# Patient Record
Sex: MALE | Race: OTHER | Employment: FULL TIME | URBAN - NONMETROPOLITAN AREA
[De-identification: names, ages, dates, MRNs, and addresses within clinical notes are randomized per-mention and may not be internally consistent; named-entity substitution may affect disease eponyms.]

---

## 2018-07-03 ENCOUNTER — OFFICE VISIT (OUTPATIENT)
Dept: UROLOGY | Age: 53
End: 2018-07-03
Payer: COMMERCIAL

## 2018-07-03 VITALS
WEIGHT: 285 LBS | DIASTOLIC BLOOD PRESSURE: 64 MMHG | HEIGHT: 67 IN | BODY MASS INDEX: 44.73 KG/M2 | SYSTOLIC BLOOD PRESSURE: 134 MMHG

## 2018-07-03 DIAGNOSIS — N48.1 BALANITIS: Primary | ICD-10-CM

## 2018-07-03 LAB
BILIRUBIN, POC: NORMAL
BLOOD URINE, POC: NORMAL
CLARITY, POC: NORMAL
COLOR, POC: NORMAL
GLUCOSE URINE, POC: NORMAL
KETONES, POC: NORMAL
LEUKOCYTE EST, POC: NORMAL
NITRITE, POC: NORMAL
PH, POC: NORMAL
PROTEIN, POC: NORMAL
SPECIFIC GRAVITY, POC: NORMAL
UROBILINOGEN, POC: NORMAL

## 2018-07-03 PROCEDURE — 99213 OFFICE O/P EST LOW 20 MIN: CPT | Performed by: NURSE PRACTITIONER

## 2018-07-03 PROCEDURE — 81002 URINALYSIS NONAUTO W/O SCOPE: CPT | Performed by: NURSE PRACTITIONER

## 2018-07-03 RX ORDER — CLOTRIMAZOLE AND BETAMETHASONE DIPROPIONATE 10; .64 MG/G; MG/G
CREAM TOPICAL
Qty: 1 TUBE | Refills: 5 | Status: SHIPPED | OUTPATIENT
Start: 2018-07-03 | End: 2018-08-21 | Stop reason: SDUPTHER

## 2018-07-03 RX ORDER — CELECOXIB 100 MG/1
100 CAPSULE ORAL 2 TIMES DAILY
COMMUNITY

## 2018-07-03 RX ORDER — M-VIT,TX,IRON,MINS/CALC/FOLIC 27MG-0.4MG
1 TABLET ORAL DAILY
COMMUNITY

## 2018-08-21 ENCOUNTER — OFFICE VISIT (OUTPATIENT)
Dept: UROLOGY | Age: 53
End: 2018-08-21
Payer: COMMERCIAL

## 2018-08-21 VITALS
WEIGHT: 281 LBS | HEIGHT: 66 IN | BODY MASS INDEX: 45.16 KG/M2 | SYSTOLIC BLOOD PRESSURE: 126 MMHG | DIASTOLIC BLOOD PRESSURE: 62 MMHG

## 2018-08-21 DIAGNOSIS — N47.5 PREPUTIAL ADHESIONS: ICD-10-CM

## 2018-08-21 DIAGNOSIS — N48.1 BALANITIS: Primary | ICD-10-CM

## 2018-08-21 PROCEDURE — 81002 URINALYSIS NONAUTO W/O SCOPE: CPT | Performed by: NURSE PRACTITIONER

## 2018-08-21 PROCEDURE — 99213 OFFICE O/P EST LOW 20 MIN: CPT | Performed by: NURSE PRACTITIONER

## 2018-08-21 RX ORDER — CLOTRIMAZOLE AND BETAMETHASONE DIPROPIONATE 10; .64 MG/G; MG/G
CREAM TOPICAL
Qty: 1 TUBE | Refills: 6 | Status: SHIPPED | OUTPATIENT
Start: 2018-08-21 | End: 2018-09-17

## 2018-08-21 NOTE — PROGRESS NOTES
[address] [facility]  [address] [place] [address]  Dept: [phone]  Loc: [phone]  Visit Date: 8/21/2018      HPI:     Henry Read f/u today for balanitis. He is doing well on Lotrisone cream, symptoms have greatly improved. Denies any open sores. He is performing good genital hygiene. He underwent circumcision on 11/22/16 due to severe phimosis with Dr. Gilberto Arroyo. Findings: Obliterated glans penis from adhesions of foreskin, reconstruction undertaken, minimal distal skin on which to stitch proximal healthy penile skin, very complicated circumcision completed. Surgical pathology consistent with balanitis xerotica obliterans, clinically phimosis. He comes in today by himself. Hx is obtained from the patient and medical record. Current Outpatient Prescriptions   Medication Sig Dispense Refill    Ferrous Gluconate-C-Folic Acid (IRON-C PO) Take by mouth      clotrimazole-betamethasone (LOTRISONE) 1-0.05 % cream Apply topically 2 times daily. 1 Tube 6    celecoxib (CELEBREX) 100 MG capsule Take 100 mg by mouth 2 times daily      Multiple Vitamins-Minerals (THERAPEUTIC MULTIVITAMIN-MINERALS) tablet Take 1 tablet by mouth daily       No current facility-administered medications for this visit. Past Medical History  Ildefonso Goncalves  has a past medical history of Arthritis; Liver disease; Male circumcision; and Pneumonia. Past Surgical History  The patient  has a past surgical history that includes knee surgery; Circumcision (2008); and Circumcision (11/22/2016). Family History  This patient's family history includes Diabetes in his brother, father, and mother. Social History  Shiva  reports that he quit smoking about 12 years ago. He has a 23.00 pack-year smoking history. He has quit using smokeless tobacco. He reports that he does not drink alcohol or use drugs.     Subjective:     Review of Systems  No problems with

## 2019-08-14 ENCOUNTER — HOSPITAL ENCOUNTER (OUTPATIENT)
Dept: GENERAL RADIOLOGY | Age: 54
Discharge: HOME | End: 2019-08-14
Payer: COMMERCIAL

## 2019-08-14 ENCOUNTER — HOSPITAL ENCOUNTER (OUTPATIENT)
Age: 54
Discharge: HOME | End: 2019-08-14
Payer: COMMERCIAL

## 2019-08-14 DIAGNOSIS — R52 PAIN: ICD-10-CM

## 2019-08-14 PROCEDURE — 73564 X-RAY EXAM KNEE 4 OR MORE: CPT

## 2019-08-20 ENCOUNTER — OFFICE VISIT (OUTPATIENT)
Dept: UROLOGY | Age: 54
End: 2019-08-20
Payer: COMMERCIAL

## 2019-08-20 VITALS
BODY MASS INDEX: 43.95 KG/M2 | HEIGHT: 67 IN | SYSTOLIC BLOOD PRESSURE: 120 MMHG | DIASTOLIC BLOOD PRESSURE: 82 MMHG | WEIGHT: 280 LBS

## 2019-08-20 DIAGNOSIS — N47.5 PENILE ADHESIONS: ICD-10-CM

## 2019-08-20 DIAGNOSIS — N48.1 BALANITIS: Primary | ICD-10-CM

## 2019-08-20 PROCEDURE — 81002 URINALYSIS NONAUTO W/O SCOPE: CPT | Performed by: NURSE PRACTITIONER

## 2019-08-20 PROCEDURE — 99213 OFFICE O/P EST LOW 20 MIN: CPT | Performed by: NURSE PRACTITIONER

## 2019-08-20 RX ORDER — CLOTRIMAZOLE AND BETAMETHASONE DIPROPIONATE 10; .64 MG/G; MG/G
CREAM TOPICAL
Qty: 1 TUBE | Refills: 5 | Status: SHIPPED | OUTPATIENT
Start: 2019-08-20 | End: 2020-08-18 | Stop reason: SDUPTHER

## 2020-07-24 ENCOUNTER — HOSPITAL ENCOUNTER (OUTPATIENT)
Age: 55
Discharge: HOME | End: 2020-07-24
Payer: COMMERCIAL

## 2020-07-24 ENCOUNTER — HOSPITAL ENCOUNTER (OUTPATIENT)
Dept: GENERAL RADIOLOGY | Age: 55
Discharge: HOME | End: 2020-07-24
Payer: COMMERCIAL

## 2020-07-24 PROCEDURE — 73564 X-RAY EXAM KNEE 4 OR MORE: CPT

## 2020-08-18 ENCOUNTER — OFFICE VISIT (OUTPATIENT)
Dept: UROLOGY | Age: 55
End: 2020-08-18
Payer: COMMERCIAL

## 2020-08-18 VITALS — BODY MASS INDEX: 47.09 KG/M2 | HEIGHT: 67 IN | WEIGHT: 300 LBS | TEMPERATURE: 97 F

## 2020-08-18 PROCEDURE — 81002 URINALYSIS NONAUTO W/O SCOPE: CPT | Performed by: NURSE PRACTITIONER

## 2020-08-18 PROCEDURE — 99213 OFFICE O/P EST LOW 20 MIN: CPT | Performed by: NURSE PRACTITIONER

## 2020-08-18 RX ORDER — CLOTRIMAZOLE AND BETAMETHASONE DIPROPIONATE 10; .64 MG/G; MG/G
CREAM TOPICAL
Qty: 1 TUBE | Refills: 5 | Status: SHIPPED | OUTPATIENT
Start: 2020-08-18 | End: 2021-04-06 | Stop reason: SDUPTHER

## 2020-08-18 NOTE — PROGRESS NOTES
[address]  [address] [address]  Dept: [phone]  Loc: [phone]  Visit Date: 8/18/2020      HPI:     Jasmin Karrie f/u today for balanitis. Doing okay, reports pain with penile cracking, splitting secondary to intercourse and erections, this occurs every other night. Overall doing okay. Uses Lotrisone cream PRN  Weight has fluctuated        He underwent circumcision on 11/22/16 due to severe phimosis with Dr. Rica Chang. Findings: Obliterated glans penis from adhesions of foreskin, reconstruction undertaken, minimal distal skin on which to stitch proximal healthy penile skin, very complicated circumcision completed. Surgical pathology consistent with balanitis xerotica obliterans, clinically phimosis. He denies any other urinary symptoms or frequent UTIs     He comes in today by himself. Hx is obtained from the patient and medical record. Current Outpatient Medications   Medication Sig Dispense Refill    clotrimazole-betamethasone (LOTRISONE) 1-0.05 % cream Apply topically 2 times daily as needed for balanitis 1 Tube 5    celecoxib (CELEBREX) 100 MG capsule Take 100 mg by mouth 2 times daily      Multiple Vitamins-Minerals (THERAPEUTIC MULTIVITAMIN-MINERALS) tablet Take 1 tablet by mouth daily      Ferrous Gluconate-C-Folic Acid (IRON-C PO) Take by mouth       No current facility-administered medications for this visit. Past [address]  has a past medical history of Arthritis, Liver disease, Male circumcision, and Pneumonia. Past Surgical History  The patient  has a past surgical history that includes knee surgery; Circumcision (2008); Circumcision (11/22/2016); and Colonoscopy (2018). Family History  This patient's family history includes Diabetes in his brother, father, and mother. Social History  Mini Scott  reports that he quit smoking about 14 years ago. He has a 23.00 pack-year smoking history.  He has quit using smokeless tobacco. He reports current alcohol use. He reports that he does not use drugs. Subjective:     Review of Systems  No problems with ears, nose or throat. No problems with eyes. No chest pain, shortness of breath, abdominal pain, extremity pain or weakness, and no neurological deficits. No rashes.  symptoms per HPI. The remainder of the review of symptoms is negative. Objective:     PE:   Vitals:    08/18/20 1014   Temp: 97 °F (36.1 °C)   Weight: 300 lb (136.1 kg)   Height: 5' 6.5\" (1.689 m)       Constitutional: Alert and oriented times 3, no acute distress and cooperative to examination with appropriate mood and affect. HENT:   Head:        Normocephalic and atraumatic. Mouth/Throat:         Mucous membranes are normal.   Eyes:         EOM are normal. No scleral icterus. PERRLA. Neck:        Supple, symmetrical, trachea midline  Pulmonary/Chest:      Chest symmetric with normal A/P diameter, Normal respiratory rate and rhthym. No use of accessory muscles. Abdominal:         Soft. No tenderness. Bowel sounds present. Musculoskeletal:         Normal range of motion. No edema or tenderness of lower extremities. Extremities: No cyanosis, clubbing, or edema present. Neurological:        Alert and oriented. No cranial nerve deficit. [facility]:        Normal mood and affect.   Genitalia: circumcised penis with urethral meatus in normal location, cracked skin and adhesions noted, on ventral side of penis to the right and left     Labs   Urine dip demonstrates   Results for POC orders placed in visit on 08/18/20   POCT Urinalysis no Micro   Result Value Ref Range    Color, UA      Clarity, UA      Glucose, UA POC      Bilirubin, UA      Ketones, UA      Spec Grav, UA      Blood, UA POC neg     pH, UA      Protein, UA POC      Urobilinogen, UA      Leukocytes, UA neg     Nitrite, UA neg        Patients recent PSA values are as follows  No results found for: PSA, PSADIA Recent BUN/Creatinine:  Lab Results   Component Value Date    BUN 17 11/24/2016    CREATININE 1.1 11/24/2016       Radiology  No recent imaging       Assessment & Plan:     Balanitis  Preputial Adhesions      Has penile adhesions, he reports they are painful, especially with erections at night. He is s/p circumcisions x2 in the past. He uses lotrisone cream with some improvement. He is undergoing knee replacement in September and the other knee in 3 months so he is not interested in any surgical intervention in the immediate future. We discussed second opinion from surgeon to see if any other interventions would be beneficial for patient. No irritative urinary symptoms. FU in office with surgeon to evaluate penile adhesions.        GEOVANNI Puente  Urology

## 2020-09-04 ENCOUNTER — OFFICE VISIT (OUTPATIENT)
Dept: UROLOGY | Age: 55
End: 2020-09-04
Payer: COMMERCIAL

## 2020-09-04 VITALS — HEIGHT: 67 IN | WEIGHT: 297.2 LBS | TEMPERATURE: 98.2 F | BODY MASS INDEX: 46.65 KG/M2

## 2020-09-04 LAB
BILIRUBIN URINE: ABNORMAL
BLOOD URINE, POC: NEGATIVE
CHARACTER, URINE: CLEAR
COLOR, URINE: YELLOW
GLUCOSE URINE: NEGATIVE MG/DL
KETONES, URINE: ABNORMAL
LEUKOCYTE CLUMPS, URINE: NEGATIVE
NITRITE, URINE: NEGATIVE
PH, URINE: 5.5 (ref 5–9)
PROTEIN, URINE: NEGATIVE MG/DL
SPECIFIC GRAVITY, URINE: >= 1.03 (ref 1–1.03)
UROBILINOGEN, URINE: 0.2 EU/DL (ref 0–1)

## 2020-09-04 PROCEDURE — 81003 URINALYSIS AUTO W/O SCOPE: CPT | Performed by: UROLOGY

## 2020-09-04 PROCEDURE — 99213 OFFICE O/P EST LOW 20 MIN: CPT | Performed by: UROLOGY

## 2020-09-04 NOTE — PROGRESS NOTES
Gunnar Mccoy MD        [address] [place] [address].  [address] [place] [address]  Dept: [phone]  Dept Fax: [phone]: [address] [address] Urology Office Note -     Patient:  Estelle Pabon  YOB: 1965    The patient is a 53 y.o. male who presents today for evaluation of the following problems:   Chief Complaint   Patient presents with    Follow-up     balanitis, penile lesions        HISTORY OF PRESENT ILLNESS:     Penile adhesions  Onset was  Years ago  Overall, the problem(s) are worse. Severity is described as moderate to severe. Associated Symptoms: No dysuria, no gross hematuria. Current Pain Severity: 2    Pt of marietta's    Examination: severe penile skin bridges circumferentailly. completely fused  Around mid glans Very bothersome. Reports no infections. Constant discomfort . Discomfort with erections. Having knee surgery the middle of this month. Circumsion surgery twice. Discussed if we did do a circumcision surgery that there is a chance it may not work. Reports \"last surgeon informed him that is all he could do. \"'          Summary of Previous Records:  Estelle Pabon f/u today for balanitis. Doing okay, reports pain with penile cracking, splitting secondary to intercourse and erections, this occurs every other night. Overall doing okay. Uses Lotrisone cream PRN  Weight has fluctuated        He underwent circumcision on 11/22/16 due to severe phimosis with Dr. Hawk Torres. Findings: Obliterated glans penis from adhesions of foreskin, reconstruction undertaken, minimal distal skin on which to stitch proximal healthy penile skin, very complicated circumcision completed. Surgical pathology consistent with balanitis xerotica obliterans, clinically phimosis.   He denies any other urinary symptoms or frequent UTIs        Requested/reviewed records from Erika Reyes MD office and/or outside with stable of the joint space height. 4. There are stable marginal osteophytes off the posterior aspect of the upper and lower pole of the right patella. 5. There is a stable enthesophyte at the patellar attachment of the quadriceps tendon. 6. There is no fracture. 7. The bony mineralization is within normal limits. 8. There is no joint effusion. LEFT KNEE: 1. There is stable left kidney varus. 2. There are stable severe degenerative changes at the medial compartment of the left femoral-tibial articulation with complete loss of the joint space, subchondral sclerosis and marginal osteophyte off the outer margin of the medial tibial plateau. 3. There are stable marginal osteophytes at the lateral compartment of the left femoral-tibial articulation with preservation of the joint space height. 4. There are stable marginal osteophytes off the posterior aspect of the upper and lower pole the patella. 5. There is a stable enthesophyte at the patellar attachment of the quadriceps tendon. 6. There is no fracture. 7. The bony mineralization is within normal limits. 8. There is no joint effusion. 1. Stable genu varus bilaterally. 2. Stable tricompartmental degenerative changes of both knees as described. **This report has been created using voice recognition software. It may contain minor errors which are inherent in voice recognition technology. ** Final report electronically signed by Dr. North Huerta on 7/24/2020 9:43 AM    Xr Knee Right (min 4 Views)    Result Date: 7/24/2020  PROCEDURE: XR KNEE RIGHT (MIN 4 VIEWS), XR KNEE LEFT (MIN 4 VIEWS) CLINICAL INFORMATION: Chronic bilateral knee pain. COMPARISON: 7/24/2020 and 8/14/2019. TECHNIQUE: AP standing, PA standing, lateral and sunrise views of both knees performed. FINDINGS: RIGHT KNEE: 1. There is stable right genu varus.  2. There are stable severe degenerative changes at the medial compartment of the right femoral-tibial articulation with complete loss of the joint space, subchondral sclerosis, subchondral cyst outer margin of the medial femoral condyle and a small marginal osteophyte off the outer margin of the medial tibial plateau. 3. There are stable small marginal osteophytes at the lateral compartment of the right femoral-tibial articulation with stable of the joint space height. 4. There are stable marginal osteophytes off the posterior aspect of the upper and lower pole of the right patella. 5. There is a stable enthesophyte at the patellar attachment of the quadriceps tendon. 6. There is no fracture. 7. The bony mineralization is within normal limits. 8. There is no joint effusion. LEFT KNEE: 1. There is stable left kidney varus. 2. There are stable severe degenerative changes at the medial compartment of the left femoral-tibial articulation with complete loss of the joint space, subchondral sclerosis and marginal osteophyte off the outer margin of the medial tibial plateau. 3. There are stable marginal osteophytes at the lateral compartment of the left femoral-tibial articulation with preservation of the joint space height. 4. There are stable marginal osteophytes off the posterior aspect of the upper and lower pole the patella. 5. There is a stable enthesophyte at the patellar attachment of the quadriceps tendon. 6. There is no fracture. 7. The bony mineralization is within normal limits. 8. There is no joint effusion. 1. Stable genu varus bilaterally. 2. Stable tricompartmental degenerative changes of both knees as described. **This report has been created using voice recognition software. It may contain minor errors which are inherent in voice recognition technology. ** Final report electronically signed by Dr. Kaiden Lux on 7/24/2020 9:43 AM      PAST MEDICAL, FAMILY AND SOCIAL HISTORY:  Past Medical History:   Diagnosis Date    Arthritis     knees, hands    Liver disease     as a kid--type unknown    Male circumcision 11/22/2016    Pneumonia      Past Surgical History:   Procedure Laterality Date    CIRCUMCISION  2008    CIRCUMCISION  2016    [facility]OSCOPY  2018    [facility]    KNEE SURGERY      bilateral torn meniscus     Family History   Problem Relation Age of Onset    Diabetes Mother     Diabetes Father     Diabetes Brother     Colon Cancer Neg Hx      Outpatient Medications Marked as Taking for the 20 encounter (Office Visit) with Janie Freeman MD   Medication Sig Dispense Refill    clotrimazole-betamethasone (Kathyrn Nutley) 1-0.05 % cream Apply topically 2 times daily as needed for balanitis 1 Tube 5    celecoxib (CELEBREX) 100 MG capsule Take 100 mg by mouth 2 times daily      Multiple Vitamins-Minerals (THERAPEUTIC MULTIVITAMIN-MINERALS) tablet Take 1 tablet by mouth daily         Patient has no known allergies. Social History     Tobacco Use   Smoking Status Former Smoker    Packs/day: 1.00    Years: 23.00    Pack years: 23.00    Last attempt to quit: 2006    Years since quittin.2   Smokeless Tobacco Former User      (If patient a smoker, smoking cessation counseling offered)   Social History     Substance and Sexual Activity   Alcohol Use Yes    Comment: occasional beer       REVIEW OF SYSTEMS:  Constitutional: negative  Eyes: negative  Respiratory: negative  Cardiovascular: negative  Gastrointestinal: negative  Genitourinary: see HPI  Musculoskeletal: negative  Skin: negative   Neurological: negative  Hematological/Lymphatic: negative  Psychological: negative            Physical Exam:    This a 53 y.o. male  Vitals:    20 1035   Temp: 98.2 °F (36.8 °C)     Body mass index is 46.55 kg/m². Constitutional: Patient in no acute distress;   See above    Assessment and Plan        1. Balanitis    2.  Penile adhesions               Plan:        Return in six months to reevaluate after knee surgery  Discussed plastic sx joint case (skin grafting to glans)      Prescriptions Ordered:  No orders of the defined types were placed in

## 2020-10-02 ENCOUNTER — HOSPITAL ENCOUNTER (OUTPATIENT)
Dept: GENERAL RADIOLOGY | Age: 55
Discharge: HOME | End: 2020-10-02
Payer: COMMERCIAL

## 2020-10-02 ENCOUNTER — HOSPITAL ENCOUNTER (OUTPATIENT)
Age: 55
Discharge: HOME | End: 2020-10-02
Payer: COMMERCIAL

## 2020-10-02 PROCEDURE — 73562 X-RAY EXAM OF KNEE 3: CPT

## 2020-11-06 ENCOUNTER — HOSPITAL ENCOUNTER (OUTPATIENT)
Age: 55
Discharge: HOME | End: 2020-11-06
Payer: COMMERCIAL

## 2020-11-06 ENCOUNTER — HOSPITAL ENCOUNTER (OUTPATIENT)
Dept: GENERAL RADIOLOGY | Age: 55
Discharge: HOME | End: 2020-11-06
Payer: COMMERCIAL

## 2020-11-06 PROCEDURE — 73562 X-RAY EXAM OF KNEE 3: CPT

## 2021-04-05 NOTE — PROGRESS NOTES
surgery; Circumcision (2008); Circumcision (11/22/2016); Colonoscopy (2018); and joint replacement (09/2020). Family History  This patient's family history includes Diabetes in his brother, father, and mother. Social History  Madina Jean  reports that he quit smoking about 14 years ago. He has a 23.00 pack-year smoking history. He has quit using smokeless tobacco. He reports current alcohol use. He reports that he does not use drugs. Subjective:     Review of Systems  No problems with ears, nose or throat. No problems with eyes. No chest pain, shortness of breath, abdominal pain, extremity pain or weakness, and no neurological deficits. No rashes.  symptoms per HPI. The remainder of the review of symptoms is negative. Objective:     PE:   Vitals:    04/06/21 0801   BP: 124/82   Temp: 97.1 °F (36.2 °C)   Weight: 292 lb (132.5 kg)   Height: 5' 7\" (1.702 m)       Constitutional: Alert and oriented times 3, no acute distress and cooperative to examination with appropriate mood and affect. HENT:   Head:        Normocephalic and atraumatic. Mouth/Throat:         Mucous membranes are normal.   Eyes:         EOM are normal. No scleral icterus. PERRLA. Neck:        Supple, symmetrical, trachea midline  Pulmonary/Chest:      Chest symmetric with normal A/P diameter, Normal respiratory rate and rhthym. No use of accessory muscles. Abdominal:         Soft. No tenderness. Bowel sounds present. Musculoskeletal:         Normal range of motion. No edema or tenderness of lower extremities. Extremities: No cyanosis, clubbing, or edema present. Neurological:        Alert and oriented. No cranial nerve deficit. [facility]:        Normal mood and affect.     Labs   Urine dip demonstrates   Results for POC orders placed in visit on 04/06/21   POCT Urinalysis no Micro   Result Value Ref Range    Color, UA yellow     Clarity, UA clear     Glucose, UA POC negative     Bilirubin, UA negative     Ketones, UA negative     Spec Grav, UA 1.020     Blood, UA POC hem. Trace     pH, UA 6.0     Protein, UA POC negatgive     Urobilinogen, UA negative     Leukocytes, UA negative     Nitrite, UA negative        Patients recent PSA values are as follows  No results found for: PSA, PSADIA     Recent BUN/Creatinine:  Lab Results   Component Value Date    BUN 17 11/24/2016    CREATININE 1.1 11/24/2016       Radiology  No recent imaging       Assessment & Plan:     Balanitis  Preputial Adhesions      Has penile adhesions, he reports they are painful, especially with erections at night. He is s/p circumcisions x2 in the past. He uses lotrisone cream with some improvement. He reports not having to use it as much now. Seen by Dr Jessi Alvarez 6 months ago, recommend if he has surgery, plastic surg be involved for skin graft. Having left knee replacement in September. Will follow up in 1 year. Call sooner if problems arise. PSA screening discussed- will see PCP regarding this. No irritative urinary symptoms.         GEOVANNI Ramirez  Urology

## 2021-04-06 ENCOUNTER — OFFICE VISIT (OUTPATIENT)
Dept: UROLOGY | Age: 56
End: 2021-04-06
Payer: COMMERCIAL

## 2021-04-06 VITALS
TEMPERATURE: 97.1 F | SYSTOLIC BLOOD PRESSURE: 124 MMHG | HEIGHT: 67 IN | DIASTOLIC BLOOD PRESSURE: 82 MMHG | WEIGHT: 292 LBS | BODY MASS INDEX: 45.83 KG/M2

## 2021-04-06 DIAGNOSIS — N48.1 BALANITIS: Primary | ICD-10-CM

## 2021-04-06 LAB
BILIRUBIN, POC: NEGATIVE
BLOOD URINE, POC: NORMAL
CLARITY, POC: CLEAR
COLOR, POC: YELLOW
GLUCOSE URINE, POC: NEGATIVE
KETONES, POC: NEGATIVE
LEUKOCYTE EST, POC: NEGATIVE
NITRITE, POC: NEGATIVE
PH, POC: 6
PROTEIN, POC: NORMAL
SPECIFIC GRAVITY, POC: 1.02
UROBILINOGEN, POC: NEGATIVE

## 2021-04-06 PROCEDURE — 81002 URINALYSIS NONAUTO W/O SCOPE: CPT | Performed by: NURSE PRACTITIONER

## 2021-04-06 PROCEDURE — 99213 OFFICE O/P EST LOW 20 MIN: CPT | Performed by: NURSE PRACTITIONER

## 2021-04-06 RX ORDER — CLOTRIMAZOLE AND BETAMETHASONE DIPROPIONATE 10; .64 MG/G; MG/G
CREAM TOPICAL
Qty: 1 TUBE | Refills: 5 | Status: SHIPPED | OUTPATIENT
Start: 2021-04-06 | End: 2021-04-27 | Stop reason: SDUPTHER

## 2021-04-26 NOTE — TELEPHONE ENCOUNTER
Pt would like sent to [facility] for 90 day supply.  I have pended for you      Vahe Carr called requesting a refill on the following medications:  Requested Prescriptions     Pending Prescriptions Disp Refills    clotrimazole-betamethasone (LOTRISONE) 1-0.05 % cream 1 Tube 5     Sig: Apply topically 2 times daily as needed for balanitis       Date of last visit: 4/6/21  Date of next visit (if applicable):

## 2021-04-27 RX ORDER — CLOTRIMAZOLE AND BETAMETHASONE DIPROPIONATE 10; .64 MG/G; MG/G
CREAM TOPICAL
Qty: 1 TUBE | Refills: 5 | Status: SHIPPED | OUTPATIENT
Start: 2021-04-27 | End: 2022-04-05 | Stop reason: SDUPTHER

## 2022-04-05 ENCOUNTER — OFFICE VISIT (OUTPATIENT)
Dept: UROLOGY | Age: 57
End: 2022-04-05
Payer: COMMERCIAL

## 2022-04-05 VITALS
HEART RATE: 80 BPM | WEIGHT: 294 LBS | HEIGHT: 67 IN | BODY MASS INDEX: 46.15 KG/M2 | SYSTOLIC BLOOD PRESSURE: 119 MMHG | DIASTOLIC BLOOD PRESSURE: 77 MMHG

## 2022-04-05 DIAGNOSIS — N47.5 PENILE ADHESIONS: ICD-10-CM

## 2022-04-05 DIAGNOSIS — N48.1 BALANITIS: Primary | ICD-10-CM

## 2022-04-05 DIAGNOSIS — N32.81 OAB (OVERACTIVE BLADDER): ICD-10-CM

## 2022-04-05 PROCEDURE — 99214 OFFICE O/P EST MOD 30 MIN: CPT | Performed by: UROLOGY

## 2022-04-05 RX ORDER — CLOTRIMAZOLE AND BETAMETHASONE DIPROPIONATE 10; .64 MG/G; MG/G
CREAM TOPICAL
Qty: 1 EACH | Refills: 3 | Status: SHIPPED | OUTPATIENT
Start: 2022-04-05

## 2022-04-05 RX ORDER — ASCORBIC ACID 500 MG
500 TABLET ORAL DAILY
COMMUNITY

## 2022-04-05 RX ORDER — MULTIVIT-MIN/IRON/FOLIC ACID/K 18-600-40
1 CAPSULE ORAL DAILY
COMMUNITY

## 2022-04-05 RX ORDER — TAMSULOSIN HYDROCHLORIDE 0.4 MG/1
0.4 CAPSULE ORAL DAILY
Qty: 90 CAPSULE | Refills: 3 | Status: SHIPPED | OUTPATIENT
Start: 2022-04-05 | End: 2022-07-04

## 2022-04-05 NOTE — PROGRESS NOTES
Sabino Copeland MD        [address] [place] [address] [phone]  Dept: [phone]  Dept Fax: [phone]: [address] [address] Urology Office Note      Patient:  Clarita Phillips  YOB: 1965    The patient is a 61 y.o. male who presents today for evaluation of the following problems:   Chief Complaint   Patient presents with    Follow-up     balanitis  needs a script for lotrisol cream        HISTORY OF PRESENT ILLNESS:       Penile adhesions  Examination: severe penile skin bridges circumferentailly. completely fused  Around mid glans Very bothersome. Reports no infections. Constant discomfort . Discomfort with erections. Having knee surgery the middle of this month. Circumsion surgery twice. Discussed if we did do a circumcision surgery that there is a chance it may not work. Reports \"last surgeon informed him that is all he could do. \"'      OAB  Diabetic  Worsening frequency  Very bothered  auass 18 urgency, intermittency. Summary of Previous Records:  Clarita Phillips f/u today for balanitis. Doing okay, reports pain with penile cracking, splitting secondary to intercourse and erections, this occurs every other night. Overall doing okay. Uses Lotrisone cream PRN  Weight has fluctuated        He underwent circumcision on 11/22/16 due to severe phimosis with Dr. Abdoulaye Doran. Findings: Obliterated glans penis from adhesions of foreskin, reconstruction undertaken, minimal distal skin on which to stitch proximal healthy penile skin, very complicated circumcision completed. Surgical pathology consistent with balanitis xerotica obliterans, clinically phimosis.   He denies any other urinary symptoms or frequent UTIs        Requested/reviewed records from Freida Bhardwaj MD office and/or outside physician/EMR    (Patient's old records have been requested, reviewed and pertinent findings summarized in today's note.)    Procedures Today: N/A    Last several PSA's:  No results found for: PSA    Last total testosterone:  No results found for: TESTOSTERONE    Urinalysis today:  No results found for this visit on 04/05/22. Last BUN and creatinine:  Lab Results   Component Value Date    BUN 17 11/24/2016     Lab Results   Component Value Date    CREATININE 1.1 11/24/2016       Imaging Reviewed during this Office Visit:   Gordon Forde MD independently reviewed the images and verified the radiology reports from:    Xr Knee Left (min 4 Views)    Result Date: 7/24/2020  PROCEDURE: XR KNEE RIGHT (MIN 4 VIEWS), XR KNEE LEFT (MIN 4 VIEWS) CLINICAL INFORMATION: Chronic bilateral knee pain. COMPARISON: 7/24/2020 and 8/14/2019. TECHNIQUE: AP standing, PA standing, lateral and sunrise views of both knees performed. FINDINGS: RIGHT KNEE: 1. There is stable right genu varus. 2. There are stable severe degenerative changes at the medial compartment of the right femoral-tibial articulation with complete loss of the joint space, subchondral sclerosis, subchondral cyst outer margin of the medial femoral condyle and a small marginal osteophyte off the outer margin of the medial tibial plateau. 3. There are stable small marginal osteophytes at the lateral compartment of the right femoral-tibial articulation with stable of the joint space height. 4. There are stable marginal osteophytes off the posterior aspect of the upper and lower pole of the right patella. 5. There is a stable enthesophyte at the patellar attachment of the quadriceps tendon. 6. There is no fracture. 7. The bony mineralization is within normal limits. 8. There is no joint effusion. LEFT KNEE: 1. There is stable left kidney varus.  2. There are stable severe degenerative changes at the medial compartment of the left femoral-tibial articulation with complete loss of the joint space, subchondral sclerosis and marginal osteophyte off the outer margin of the medial tibial plateau. 3. There are stable marginal osteophytes at the lateral compartment of the left femoral-tibial articulation with preservation of the joint space height. 4. There are stable marginal osteophytes off the posterior aspect of the upper and lower pole the patella. 5. There is a stable enthesophyte at the patellar attachment of the quadriceps tendon. 6. There is no fracture. 7. The bony mineralization is within normal limits. 8. There is no joint effusion. 1. Stable genu varus bilaterally. 2. Stable tricompartmental degenerative changes of both knees as described. **This report has been created using voice recognition software. It may contain minor errors which are inherent in voice recognition technology. ** Final report electronically signed by Dr. Demetrio Mccarthy on 7/24/2020 9:43 AM    Xr Knee Right (min 4 Views)    Result Date: 7/24/2020  PROCEDURE: XR KNEE RIGHT (MIN 4 VIEWS), XR KNEE LEFT (MIN 4 VIEWS) CLINICAL INFORMATION: Chronic bilateral knee pain. COMPARISON: 7/24/2020 and 8/14/2019. TECHNIQUE: AP standing, PA standing, lateral and sunrise views of both knees performed. FINDINGS: RIGHT KNEE: 1. There is stable right genu varus. 2. There are stable severe degenerative changes at the medial compartment of the right femoral-tibial articulation with complete loss of the joint space, subchondral sclerosis, subchondral cyst outer margin of the medial femoral condyle and a small marginal osteophyte off the outer margin of the medial tibial plateau. 3. There are stable small marginal osteophytes at the lateral compartment of the right femoral-tibial articulation with stable of the joint space height. 4. There are stable marginal osteophytes off the posterior aspect of the upper and lower pole of the right patella. 5. There is a stable enthesophyte at the patellar attachment of the quadriceps tendon. 6. There is no fracture. 7. The bony mineralization is within normal limits.  8. There is no joint effusion. LEFT KNEE: 1. There is stable left kidney varus. 2. There are stable severe degenerative changes at the medial compartment of the left femoral-tibial articulation with complete loss of the joint space, subchondral sclerosis and marginal osteophyte off the outer margin of the medial tibial plateau. 3. There are stable marginal osteophytes at the lateral compartment of the left femoral-tibial articulation with preservation of the joint space height. 4. There are stable marginal osteophytes off the posterior aspect of the upper and lower pole the patella. 5. There is a stable enthesophyte at the patellar attachment of the quadriceps tendon. 6. There is no fracture. 7. The bony mineralization is within normal limits. 8. There is no joint effusion. 1. Stable genu varus bilaterally. 2. Stable tricompartmental degenerative changes of both knees as described. **This report has been created using voice recognition software. It may contain minor errors which are inherent in voice recognition technology. ** Final report electronically signed by Dr. Aloma Mcardle on 7/24/2020 9:43 AM      PAST MEDICAL, FAMILY AND SOCIAL HISTORY:  Past Medical History:   Diagnosis Date    Arthritis     knees, hands    Liver disease     as a kid--type unknown    Male circumcision 11/22/2016    Pneumonia     Pre-diabetes      Past Surgical History:   Procedure Laterality Date    CIRCUMCISION  2008    CIRCUMCISION  11/22/2016    [facility]COPY  2018    [facility]    JOINT REPLACEMENT  09/2020    [facility]    KNEE SURGERY      bilateral torn meniscus     Family History   Problem Relation Age of Onset    Diabetes Mother     Diabetes Father     Diabetes Brother     Colon Cancer Neg Hx      Outpatient Medications Marked as Taking for the 4/5/22 encounter (Office Visit) with Collin Copeland MD   Medication Sig Dispense Refill    ascorbic acid (VITAMIN C) 500 MG tablet Take 500 mg by mouth daily      Vitamin A 2250 MCG (7500 UT) CAPS Take 1 tablet by mouth daily      Cholecalciferol (VITAMIN D) 50 MCG (2000 UT) CAPS capsule Take 1 capsule by mouth daily      clotrimazole-betamethasone (LOTRISONE) 1-0.05 % cream Apply topically 2 times daily as needed for balanitis 1 Tube 5    metFORMIN (GLUCOPHAGE) 500 MG tablet Take 500 mg by mouth daily      celecoxib (CELEBREX) 100 MG capsule Take 100 mg by mouth 2 times daily      Multiple Vitamins-Minerals (THERAPEUTIC MULTIVITAMIN-MINERALS) tablet Take 1 tablet by mouth daily         Patient has no known allergies. Social History     Tobacco Use   Smoking Status Former Smoker    Packs/day: 1.00    Years: 23.00    Pack years: 23.00    Quit date: 6/25/2006    Years since quitting: 15.7   Smokeless Tobacco Former User      (If patient a smoker, smoking cessation counseling offered)   Social History     Substance and Sexual Activity   Alcohol Use Yes    Comment: occasional beer       REVIEW OF SYSTEMS:  Constitutional: negative  Eyes: negative  Respiratory: negative  Cardiovascular: negative  Gastrointestinal: negative  Genitourinary: see HPI  Musculoskeletal: negative  Skin: negative   Neurological: negative  Hematological/Lymphatic: negative  Psychological: negative            Physical Exam:    This a 61 y.o. male  Vitals:    04/05/22 1453   BP: 119/77   Pulse: 80     Body mass index is 46.05 kg/m². Constitutional: Patient in no acute distress;   See above    Assessment and Plan        1. Balanitis    2. Penile adhesions    3. OAB (overactive bladder)               Plan:      Balanitis- lotrisome cream PRN. Not circumcision candidate (would need plastics intervention for recirc)  oab/BPH- unsure if from diabetes or BPH. worsening. will start flomax. F/u in three months for med check      Prescriptions Ordered:  No orders of the defined types were placed in this encounter. Orders Placed:  No orders of the defined types were placed in this encounter.            Yon Wong MD

## 2022-07-05 ENCOUNTER — OFFICE VISIT (OUTPATIENT)
Dept: UROLOGY | Age: 57
End: 2022-07-05
Payer: COMMERCIAL

## 2022-07-05 VITALS — BODY MASS INDEX: 44.73 KG/M2 | WEIGHT: 285 LBS | HEIGHT: 67 IN | RESPIRATION RATE: 18 BRPM

## 2022-07-05 DIAGNOSIS — N47.5 PENILE ADHESIONS: ICD-10-CM

## 2022-07-05 DIAGNOSIS — N40.1 BENIGN LOCALIZED PROSTATIC HYPERPLASIA WITH LOWER URINARY TRACT SYMPTOMS (LUTS): ICD-10-CM

## 2022-07-05 DIAGNOSIS — N32.81 OAB (OVERACTIVE BLADDER): ICD-10-CM

## 2022-07-05 DIAGNOSIS — N48.1 BALANITIS: Primary | ICD-10-CM

## 2022-07-05 PROCEDURE — 99214 OFFICE O/P EST MOD 30 MIN: CPT | Performed by: UROLOGY

## 2022-07-05 RX ORDER — HYDROXYZINE HYDROCHLORIDE 25 MG/1
25 TABLET, FILM COATED ORAL 3 TIMES DAILY PRN
COMMUNITY

## 2022-07-05 RX ORDER — CITALOPRAM 20 MG/1
20 TABLET ORAL DAILY
COMMUNITY

## 2022-07-05 RX ORDER — TAMSULOSIN HYDROCHLORIDE 0.4 MG/1
0.4 CAPSULE ORAL DAILY
COMMUNITY

## 2022-07-05 NOTE — PROGRESS NOTES
Veronica Juarez MD        [address] [place] [address].  [address] [place]  [place] [address]  Dept: [phone]  Dept Fax: [phone]: [address] [address] Urology Office Note -     Patient:  Ranjith Gamez  YOB: 1965    The patient is a 61 y.o. male who presents today for evaluation of the following problems:   Chief Complaint   Patient presents with    Urinary Frequency     was started on flomax - 50% improvement.  Follow-up     balanitis- given lotrisone cream. improved. HISTORY OF PRESENT ILLNESS:       Penile adhesions  Examination: severe penile skin bridges circumferentailly. completely fused  Around mid glans. bothersome. Reports no infections. Constant discomfort . Discomfort with erections. Having knee surgery the middle of this month. Circumsion surgery twice. Discussed if we did do a circumcision surgery that there is a chance it may not work. Reports \"last surgeon informed him that is all he could do. \"'      BPH/OAB  Here for flomax check. 50 percent improvement  Diabetic  Improving frequency    Previously: auass 18 urgency, intermittency. Summary of Previous Records:  Ranjith Gamez f/u today for balanitis. Doing okay, reports pain with penile cracking, splitting secondary to intercourse and erections, this occurs every other night. Overall doing okay. Uses Lotrisone cream PRN  Weight has fluctuated        He underwent circumcision on 11/22/16 due to severe phimosis with Dr. Luís Oneal. Findings: Obliterated glans penis from adhesions of foreskin, reconstruction undertaken, minimal distal skin on which to stitch proximal healthy penile skin, very complicated circumcision completed. Surgical pathology consistent with balanitis xerotica obliterans, clinically phimosis.   He denies any other urinary symptoms or frequent UTIs        Requested/reviewed records from Sherice Dumont MD office and/or outside [de-identified]    (Patient's old records have been requested, reviewed and pertinent findings summarized in today's note.)    Procedures Today: N/A    Last several PSA's:  No results found for: PSA    Last total testosterone:  No results found for: TESTOSTERONE    Urinalysis today:  No results found for this visit on 07/05/22.     Last BUN and creatinine:  Lab Results   Component Value Date    BUN 17 11/24/2016     Lab Results   Component Value Date    CREATININE 1.1 11/24/2016       Imaging Reviewed during this Office Visit:   Pascual Bello MD independently reviewed the images and verified the radiology reports from:        PAST MEDICAL, FAMILY AND SOCIAL HISTORY:  Past Medical History:   Diagnosis Date    Arthritis     knees, hands    Liver disease     as a kid--type unknown    Male circumcision 11/22/2016    Pneumonia     Pre-diabetes     PTSD (post-traumatic stress disorder)     anxiety from covid     Past Surgical History:   Procedure Laterality Date    CIRCUMCISION  2008    CIRCUMCISION  11/22/2016    [facility]COPY  2018    [facility]    [facility] RE[facility]ACEMENT  09/2020    [facility]    KNEE SURGERY      bilateral torn meniscus     Family History   Problem Relation Age of Onset    Diabetes Mother     Diabetes Father     Diabetes Brother     Colon Cancer Neg Hx      Outpatient Medications Marked as Taking for the 7/5/22 encounter (Office Visit) with Kimani Calloway MD   Medication Sig Dispense Refill    tamsulosin (FLOMAX) 0.4 MG capsule Take 0.4 mg by mouth daily      hydrOXYzine HCl (ATARAX) 25 MG tablet Take 25 mg by mouth 3 times daily as needed for Itching      citalopram (CELEXA) 20 MG tablet Take 20 mg by mouth daily      ascorbic acid (VITAMIN C) 500 MG tablet Take 500 mg by mouth daily      Vitamin A 2250 MCG (7500 UT) CAPS Take 1 tablet by mouth daily      Cholecalciferol (VITAMIN D) 50 MCG (2000 UT) CAPS capsule Take 1 capsule by mouth daily      clotrimazole-betamethasone (LOTRISONE) 1-0.05 % cream Apply topically 2 times daily as needed for balanitis 1 each 3    metFORMIN (GLUCOPHAGE) 500 MG tablet Take 500 mg by mouth daily      celecoxib (CELEBREX) 100 MG capsule Take 100 mg by mouth 2 times daily      Multiple Vitamins-Minerals (THERAPEUTIC MULTIVITAMIN-MINERALS) tablet Take 1 tablet by mouth daily         Patient has no known allergies. Social History     Tobacco Use   Smoking Status Former Smoker    Packs/day: 1.00    Years: 23.00    Pack years: 23.00    Quit date: 2006    Years since quittin.0   Smokeless Tobacco Former User      (If patient a smoker, smoking cessation counseling offered)   Social History     Substance and Sexual Activity   Alcohol Use Yes    Comment: occasional beer       REVIEW OF SYSTEMS:  Constitutional: negative  Eyes: negative  Respiratory: negative  Cardiovascular: negative  Gastrointestinal: negative  Genitourinary: see HPI  Musculoskeletal: negative  Skin: negative   Neurological: negative  Hematological/Lymphatic: negative  Psychological: negative            Physical Exam:    This a 61 y.o. male  Vitals:    22 1451   Resp: 18     Body mass index is 44.64 kg/m². Constitutional: Patient in no acute distress;   See above    Assessment and Plan        1. Balanitis    2. OAB (overactive bladder)    3. Penile adhesions               Plan:      Balanitis- stable. lotrisome cream PRN. Not circumcision candidate (would need plastics intervention for recirc)  oab/BPH- small component of diabetes. flomax has helped and pt is satisfied. Will folow. Two stable chronic illness  F/u in one year for bph check and psa (do not see in system)      Prescriptions Ordered:  No orders of the defined types were placed in this encounter. Orders Placed:  No orders of the defined types were placed in this encounter.            Yaquelin Ballesteros MD

## 2023-03-06 RX ORDER — TAMSULOSIN HYDROCHLORIDE 0.4 MG/1
CAPSULE ORAL
Qty: 90 CAPSULE | Refills: 3 | Status: SHIPPED | OUTPATIENT
Start: 2023-03-06

## 2023-03-06 NOTE — TELEPHONE ENCOUNTER
Desire Rob called requesting a refill on the following medications:  Requested Prescriptions     Pending Prescriptions Disp Refills    tamsulosin (FLOMAX) 0.4 MG capsule [Pharmacy Med Name: TAMSULOSIN HCL 0.4 MG CAPSULE] 90 capsule 3     Sig: take 1 capsule by mouth once daily     Pharmacy verified:  .[email or web address]      Date of last visit: 07/05/2022  Date of next visit (if applicable): 2/10/4236

## 2024-02-22 RX ORDER — TAMSULOSIN HYDROCHLORIDE 0.4 MG/1
CAPSULE ORAL
Qty: 30 CAPSULE | Refills: 0 | Status: SHIPPED | OUTPATIENT
Start: 2024-02-22

## 2024-02-22 NOTE — TELEPHONE ENCOUNTER
Shiva De Los Santos called requesting a refill on the following medications:  Requested Prescriptions     Pending Prescriptions Disp Refills    tamsulosin (FLOMAX) 0.4 MG capsule [Pharmacy Med Name: TAMSULOSIN HCL 0.4 MG CAPSULE] 90 capsule 3     Sig: take 1 capsule by mouth once daily     Pharmacy verified:  .[email or web address]      Date of last visit: 07/05/2022  Date of next visit (if applicable): Patient no showed last appointment    Voicemail left to schedule a follow up appointment.

## 2024-02-26 NOTE — TELEPHONE ENCOUNTER
Made 3rd attempt to schedule the follow up appointment. Voicemail left to return the call to the office.

## 2024-03-26 ENCOUNTER — OFFICE VISIT (OUTPATIENT)
Dept: UROLOGY | Age: 59
End: 2024-03-26
Payer: COMMERCIAL

## 2024-03-26 VITALS — HEIGHT: 67 IN | WEIGHT: 276 LBS | RESPIRATION RATE: 14 BRPM | BODY MASS INDEX: 43.32 KG/M2

## 2024-03-26 DIAGNOSIS — N47.5 PENILE ADHESIONS: ICD-10-CM

## 2024-03-26 DIAGNOSIS — N48.1 BALANITIS: ICD-10-CM

## 2024-03-26 DIAGNOSIS — N40.1 BENIGN LOCALIZED PROSTATIC HYPERPLASIA WITH LOWER URINARY TRACT SYMPTOMS (LUTS): ICD-10-CM

## 2024-03-26 DIAGNOSIS — N32.81 OAB (OVERACTIVE BLADDER): ICD-10-CM

## 2024-03-26 PROCEDURE — 99214 OFFICE O/P EST MOD 30 MIN: CPT | Performed by: UROLOGY

## 2024-03-26 RX ORDER — TAMSULOSIN HYDROCHLORIDE 0.4 MG/1
0.4 CAPSULE ORAL DAILY
Qty: 90 CAPSULE | Refills: 3 | Status: SHIPPED | OUTPATIENT
Start: 2024-03-26 | End: 2024-06-24

## 2024-03-26 NOTE — PROGRESS NOTES
PATRICK BELLO MD        [facility]  [address]  [address]  [facility] [address]  Dept: [phone]  Dept Fax: [phone]  Loc: [phone]      [facility] Urology Office Note -     Patient:  Shiva De Los Santos  YOB: 1965    The patient is a 58 y.o. male who presents today for evaluation of the following problems:   Chief Complaint   Patient presents with    Benign Prostatic Hypertrophy    Follow-up     1yr follow up, psa not done prior. Prescription refill        HISTORY OF PRESENT ILLNESS:       Penile adhesions  Examination: severe penile skin bridges circumferentailly. completely fused  Around mid glans. bothersome.     Reports no infections.   Constant discomfort .  Discomfort with erections.   Having knee surgery the middle of this month.   Circumsion surgery twice.   Discussed if we did do a circumcision surgery that there is a chance it may not work. Reports \"last surgeon informed him that is all he could do. \"'      BPH/OAB  Here for flomax check. stable  Diabetic  Improving frequency    Previously: auass 18 urgency, intermittency. Now auss 8        Summary of Previous Records:  Shiva SCALES Filiberto f/u today for balanitis. Doing okay, reports pain with penile cracking, splitting secondary to intercourse and erections, this occurs every other night. Overall doing okay. Uses Lotrisone cream PRN  Weight has fluctuated        He underwent circumcision on 11/22/16 due to severe phimosis with Dr. Cohn. Findings: Obliterated glans penis from adhesions of foreskin, reconstruction undertaken, minimal distal skin on which to stitch proximal healthy penile skin, very complicated circumcision completed.  Surgical pathology consistent with balanitis xerotica obliterans, clinically phimosis.  He denies any other urinary symptoms or frequent UTIs        Requested/reviewed records from Otto Harrison MD office and/or outside  Cantharidin Pregnancy And Lactation Text: This medication has not been proven safe during pregnancy. It is unknown if this medication is excreted in breast milk.

## 2024-10-25 ENCOUNTER — HOSPITAL ENCOUNTER (EMERGENCY)
Age: 59
Discharge: HOME | End: 2024-10-25
Attending: FAMILY MEDICINE
Payer: COMMERCIAL

## 2024-10-25 ENCOUNTER — APPOINTMENT (OUTPATIENT)
Dept: GENERAL RADIOLOGY | Age: 59
End: 2024-10-25
Payer: COMMERCIAL

## 2024-10-25 VITALS
TEMPERATURE: 97.3 F | OXYGEN SATURATION: 96 % | DIASTOLIC BLOOD PRESSURE: 66 MMHG | HEIGHT: 67 IN | WEIGHT: 272 LBS | RESPIRATION RATE: 20 BRPM | HEART RATE: 79 BPM | SYSTOLIC BLOOD PRESSURE: 145 MMHG | BODY MASS INDEX: 42.69 KG/M2

## 2024-10-25 DIAGNOSIS — S46.912A STRAIN OF LEFT SHOULDER, INITIAL ENCOUNTER: Primary | ICD-10-CM

## 2024-10-25 PROCEDURE — 73030 X-RAY EXAM OF SHOULDER: CPT

## 2024-10-25 PROCEDURE — 99283 EMERGENCY DEPT VISIT LOW MDM: CPT

## 2024-10-25 RX ORDER — CELECOXIB 100 MG/1
100 CAPSULE ORAL 2 TIMES DAILY
Qty: 60 CAPSULE | Refills: 0 | Status: SHIPPED | OUTPATIENT
Start: 2024-10-25

## 2024-10-25 RX ORDER — CYCLOBENZAPRINE HCL 10 MG
10 TABLET ORAL 2 TIMES DAILY PRN
Qty: 20 TABLET | Refills: 0 | Status: SHIPPED | OUTPATIENT
Start: 2024-10-25 | End: 2024-11-04

## 2024-10-25 ASSESSMENT — ENCOUNTER SYMPTOMS
CHEST TIGHTNESS: 0
VOMITING: 0
NAUSEA: 0
SHORTNESS OF BREATH: 0

## 2024-10-25 ASSESSMENT — PAIN DESCRIPTION - LOCATION: LOCATION: SHOULDER

## 2024-10-25 ASSESSMENT — PAIN DESCRIPTION - ORIENTATION: ORIENTATION: LEFT

## 2024-10-25 ASSESSMENT — PAIN SCALES - GENERAL: PAINLEVEL_OUTOF10: 10

## 2024-10-25 ASSESSMENT — PAIN - FUNCTIONAL ASSESSMENT: PAIN_FUNCTIONAL_ASSESSMENT: 0-10

## 2024-10-25 ASSESSMENT — PAIN DESCRIPTION - DESCRIPTORS: DESCRIPTORS: ACHING

## 2024-10-26 NOTE — ED PROVIDER NOTES
[facility]  eMERGENCY dEPARTMENT eNCOUnter          CHIEF COMPLAINT       Chief Complaint   Patient presents with    Arm Pain    Shoulder Pain       Nurses Notes reviewed and I agree except as noted in the HPI.      HISTORY OF PRESENT ILLNESS    Shiva De Los Santos is a 58 y.o. male who presents with left anterior shoulder pain. Patient notes pain awoke him around 10 am today. Patient works third shift. Patient wife states that she heard a loud thump coming from his room this morning. Patient is unaware of any trauma. Denies chest pain. Denies nausea,vomiting. Notes increase pain with activity of left arm.          REVIEW OF SYSTEMS     Review of Systems   Constitutional:  Positive for activity change. Negative for chills and fever.   Respiratory:  Negative for chest tightness and shortness of breath.    Cardiovascular:  Negative for chest pain and palpitations.   Gastrointestinal:  Negative for nausea and vomiting.   Musculoskeletal:  Positive for arthralgias (left shoulder). Negative for joint swelling, neck pain and neck stiffness.   All other systems reviewed and are negative.        PAST MEDICAL HISTORY    has a past medical history of Arthritis, Liver disease, Male circumcision, Pneumonia, Pre-diabetes, and PTSD (post-traumatic stress disorder).    SURGICAL HISTORY      has a past surgical history that includes knee surgery; Circumcision (2008); Circumcision (11/22/2016); Colonoscopy (2018); and joint replacement (09/2020).    CURRENT MEDICATIONS       Previous Medications    ASCORBIC ACID (VITAMIN C) 500 MG TABLET    Take 1 tablet by mouth daily    CHOLECALCIFEROL (VITAMIN D) 50 MCG (2000 UT) CAPS CAPSULE    Take 1 capsule by mouth daily    CITALOPRAM (CELEXA) 20 MG TABLET    Take 1 tablet by mouth daily    CLOTRIMAZOLE-BETAMETHASONE (LOTRISONE) 1-0.05 % CREAM    Apply topically 2 times daily as needed for balanitis    HYDROXYZINE HCL (ATARAX) 25 MG TABLET    Take 25 mg by mouth 3 times daily as  interpreted by the Emergency Department Physician who either signs or Co-signs this chart in the absence of a cardiologist.      RADIOLOGY: non-plain film images(s) such as CT, Ultrasound and MRI are read by the radiologist.      LABS:   Labs Reviewed - No data to display    EMERGENCY DEPARTMENT COURSE:   Vitals:    Vitals:    10/25/24 2110   BP: (!) 145/66   Pulse: 79   Resp: 20   Temp: 97.3 °F (36.3 °C)   TempSrc: Temporal   SpO2: 96%   Weight: 123.4 kg (272 lb)   Height: 1.702 m (5' 7\")     The left shoulder appears of normal contour and shape.  Pain seems to be in the anterior aspect almost at the bicipital notch of the left upper arm.  Pulses are good distally.  Normal temperature upon palpation of the left shoulder region.  He denies any chest pain.  Patient grimaces with any movement of the left arm.  He is limited in his range of motion so step-off test is hard to assess.  X-rays of the left shoulder were obtained.  There were no trapezius pain no neck pain was noted.    CRITICAL CARE:       CONSULTS:      PROCEDURES:  None    FINAL IMPRESSION      1. Strain of left shoulder, initial encounter          DISPOSITION/PLAN   Home.  Restart Celebrex as previously prescribed. Cyclobenzaprine as prescribed as needed. Ice to left shoulder. Limit lifting with left shoulder. If continued pain follow up with PCP or orthopedics.     PATIENT REFERRED TO:  Amilcar Beck,   [address] [place] [address] [place] [place] [address] [phone]    Call in 3 days  If symptoms worsen, As needed      DISCHARGE MEDICATIONS:  New Prescriptions    CELECOXIB (CELEBREX) 100 MG CAPSULE    Take 1 capsule by mouth 2 times daily    CYCLOBENZAPRINE (FLEXERIL) 10 MG TABLET    Take 1 tablet by mouth 2 times daily as needed for Muscle spasms       (Please note that portions of this note were completed with a voice recognition program.  Efforts were made to edit the dictations but occasionally words are mis-transcribed.)    Valdez Grande MD

## 2024-10-26 NOTE — DISCHARGE INSTRUCTIONS
Restart Celebrex as previously prescribed. Cyclobenzaprine as prescribed as needed. Ice to left shoulder. Limit lifting with left shoulder. If continued pain follow up with PCP or orthopedics.

## 2024-10-26 NOTE — ED TRIAGE NOTES
Pt comes walking into ER room 6 from triage with left shoulder pain with NKI. Pt works 3rd shift and when he woke up from sleep today around 10 am he started having this left shoulder pain. Pain is 10/10 when he moves his shoulder, and the pain shoots from his shoulder to his elbow \"FUNNY BONE\" area with funny bone feeling. Normal PMS, but limited ROM. Radial pulses is intact and strong. CAP refill < 3 sec.

## 2024-10-26 NOTE — ED NOTES
Pt stable and off to Radiology via ED cart with [employer] tech. Pt states no concerns and vitals stable.

## 2025-03-19 ENCOUNTER — HOSPITAL ENCOUNTER (OUTPATIENT)
Age: 60
Discharge: HOME | End: 2025-03-19
Payer: COMMERCIAL

## 2025-03-19 DIAGNOSIS — N40.1 BENIGN LOCALIZED PROSTATIC HYPERPLASIA WITH LOWER URINARY TRACT SYMPTOMS (LUTS): ICD-10-CM

## 2025-03-19 PROCEDURE — 36415 COLL VENOUS BLD VENIPUNCTURE: CPT

## 2025-03-19 PROCEDURE — 84153 ASSAY OF PSA TOTAL: CPT

## 2025-03-20 ENCOUNTER — OFFICE VISIT (OUTPATIENT)
Dept: UROLOGY | Age: 60
End: 2025-03-20
Payer: COMMERCIAL

## 2025-03-20 VITALS — WEIGHT: 270 LBS | BODY MASS INDEX: 42.38 KG/M2 | HEIGHT: 67 IN | RESPIRATION RATE: 20 BRPM

## 2025-03-20 DIAGNOSIS — N40.1 BENIGN LOCALIZED PROSTATIC HYPERPLASIA WITH LOWER URINARY TRACT SYMPTOMS (LUTS): Primary | ICD-10-CM

## 2025-03-20 LAB — PSA SERPL-MCNC: 1.96 NG/ML (ref 0–1)

## 2025-03-20 PROCEDURE — 99214 OFFICE O/P EST MOD 30 MIN: CPT

## 2025-03-20 RX ORDER — TAMSULOSIN HYDROCHLORIDE 0.4 MG/1
0.4 CAPSULE ORAL DAILY
Qty: 90 CAPSULE | Refills: 3 | Status: SHIPPED | OUTPATIENT
Start: 2025-03-20 | End: 2026-03-15

## 2025-03-20 RX ORDER — CLOTRIMAZOLE AND BETAMETHASONE DIPROPIONATE 10; .64 MG/G; MG/G
CREAM TOPICAL
Qty: 1 EACH | Refills: 3 | Status: SHIPPED | OUTPATIENT
Start: 2025-03-20

## 2025-03-20 NOTE — PATIENT INSTRUCTIONS
PSA in 1 year  Continue Flomax 0.4 mg daily  The medication list included in this document is our record of what you are currently taking, including any changes that were made at today's visit.  If you find any differences when compared to your medications at home, or have any questions that were not answered at your visit, please contact the office.  Call with questions, comments, or concerns. I recommend going to the ED for further evaluation if you develop fever, chills, nausea, vomiting, chest pain, SOB, or calf pain.

## 2025-07-28 ENCOUNTER — HOSPITAL ENCOUNTER (EMERGENCY)
Age: 60
Discharge: HOME | End: 2025-07-28
Attending: EMERGENCY MEDICINE
Payer: COMMERCIAL

## 2025-07-28 ENCOUNTER — APPOINTMENT (OUTPATIENT)
Dept: GENERAL RADIOLOGY | Age: 60
End: 2025-07-28
Payer: COMMERCIAL

## 2025-07-28 VITALS
BODY MASS INDEX: 42.38 KG/M2 | RESPIRATION RATE: 15 BRPM | DIASTOLIC BLOOD PRESSURE: 80 MMHG | OXYGEN SATURATION: 96 % | TEMPERATURE: 98.2 F | SYSTOLIC BLOOD PRESSURE: 160 MMHG | HEIGHT: 67 IN | WEIGHT: 270 LBS | HEART RATE: 88 BPM

## 2025-07-28 DIAGNOSIS — R07.89 ATYPICAL CHEST PAIN: Primary | ICD-10-CM

## 2025-07-28 DIAGNOSIS — J40 BRONCHITIS: ICD-10-CM

## 2025-07-28 LAB
ALBUMIN SERPL BCP-MCNC: 3.6 GM/DL (ref 3.4–5)
ALP SERPL-CCNC: 90 U/L (ref 46–116)
ALT SERPL W P-5'-P-CCNC: 28 U/L (ref 14–63)
ANION GAP SERPL CALC-SCNC: 7 MEQ/L (ref 8–16)
AST SERPL W P-5'-P-CCNC: 19 U/L (ref 15–37)
BASOPHILS # BLD: 1 % (ref 0–3)
BASOPHILS ABSOLUTE: 0.1 THOU/MM3 (ref 0–0.1)
BILIRUB SERPL-MCNC: 0.5 MG/DL (ref 0.2–1)
BUN SERPL-MCNC: 21 MG/DL (ref 7–18)
CALCIUM SERPL-MCNC: 8.5 MG/DL (ref 8.5–10.1)
CHLORIDE SERPL-SCNC: 105 MEQ/L (ref 98–107)
CO2 SERPL-SCNC: 29 MEQ/L (ref 21–32)
CREAT SERPL-MCNC: 1.3 MG/DL (ref 0.6–1.3)
EOSINOPHILS ABSOLUTE: 0.5 THOU/MM3 (ref 0–0.5)
EOSINOPHILS RELATIVE PERCENT: 4.5 % (ref 0–4)
GFR SERPL CREATININE-BSD FRML MDRD: 63 ML/MIN/1.73M2
GLUCOSE SERPL-MCNC: 117 MG/DL (ref 74–106)
HCT VFR BLD CALC: 38 % (ref 42–52)
HEMOGLOBIN: 11.8 GM/DL (ref 14–18)
IMMATURE GRANS (ABS): 0 THOU/MM3 (ref 0–0.07)
IMMATURE GRANULOCYTES %: 0 %
LYMPHOCYTES # BLD AUTO: 25.2 % (ref 15–47)
LYMPHOCYTES ABSOLUTE: 2.5 THOU/MM3 (ref 1–4.8)
MCH RBC QN AUTO: 23.2 PG (ref 26–32)
MCHC RBC AUTO-ENTMCNC: 31.1 GM/DL (ref 31–35)
MCV RBC AUTO: 74.8 FL (ref 80–94)
MONOCYTES: 1.2 THOU/MM3 (ref 0.3–1.3)
MONOCYTES: 11.4 % (ref 0–12)
NEUTROPHILS ABSOLUTE: 5.8 THOU/MM3 (ref 1.8–7.7)
NT PRO BNP: 22 PG/ML (ref 0–125)
PDW BLD-RTO: 18.6 % (ref 11.5–14.9)
PLATELET # BLD AUTO: 261 THOU/MM3 (ref 130–400)
PMV BLD AUTO: 9.8 FL (ref 9.4–12.4)
POTASSIUM SERPL-SCNC: 3.3 MEQ/L (ref 3.5–5.1)
PROT SERPL-MCNC: 7.9 GM/DL (ref 6.4–8.2)
RBC # BLD: 5.08 MILL/MM3 (ref 4.5–6.1)
SEG NEUTROPHILS: 57.7 % (ref 43–75)
SODIUM SERPL-SCNC: 141 MEQ/L (ref 136–145)
TROPONIN, HIGH SENSITIVITY: 7 PG/ML (ref 0–76.1)
WBC # BLD: 10.1 THOU/MM3 (ref 4.8–10.8)

## 2025-07-28 PROCEDURE — 84484 ASSAY OF TROPONIN QUANT: CPT

## 2025-07-28 PROCEDURE — 93005 ELECTROCARDIOGRAM TRACING: CPT | Performed by: EMERGENCY MEDICINE

## 2025-07-28 PROCEDURE — 99285 EMERGENCY DEPT VISIT HI MDM: CPT

## 2025-07-28 PROCEDURE — 71046 X-RAY EXAM CHEST 2 VIEWS: CPT

## 2025-07-28 PROCEDURE — 80053 COMPREHEN METABOLIC PANEL: CPT

## 2025-07-28 PROCEDURE — 85025 COMPLETE CBC W/AUTO DIFF WBC: CPT

## 2025-07-28 PROCEDURE — 83880 ASSAY OF NATRIURETIC PEPTIDE: CPT

## 2025-07-28 RX ORDER — AZITHROMYCIN 250 MG/1
TABLET, FILM COATED ORAL
Qty: 4 TABLET | Refills: 0 | Status: SHIPPED | OUTPATIENT
Start: 2025-07-28

## 2025-07-28 RX ORDER — AZITHROMYCIN 250 MG/1
500 TABLET, FILM COATED ORAL ONCE
Status: DISCONTINUED | OUTPATIENT
Start: 2025-07-28 | End: 2025-07-29 | Stop reason: HOSPADM

## 2025-07-28 RX ORDER — PREDNISONE 20 MG/1
20 TABLET ORAL ONCE
Status: DISCONTINUED | OUTPATIENT
Start: 2025-07-28 | End: 2025-07-29 | Stop reason: HOSPADM

## 2025-07-28 RX ORDER — PREDNISONE 20 MG/1
20 TABLET ORAL 2 TIMES DAILY
Qty: 10 TABLET | Refills: 0 | Status: SHIPPED | OUTPATIENT
Start: 2025-07-28 | End: 2025-08-02

## 2025-07-28 ASSESSMENT — PAIN SCALES - GENERAL
PAINLEVEL_OUTOF10: 4
PAINLEVEL_OUTOF10: 8

## 2025-07-28 ASSESSMENT — PAIN - FUNCTIONAL ASSESSMENT: PAIN_FUNCTIONAL_ASSESSMENT: 0-10

## 2025-07-28 ASSESSMENT — HEART SCORE: ECG: NORMAL

## 2025-07-28 ASSESSMENT — PAIN DESCRIPTION - LOCATION: LOCATION: CHEST

## 2025-07-29 LAB
EKG ATRIAL RATE: 91 BPM
EKG P AXIS: 63 DEGREES
EKG P-R INTERVAL: 154 MS
EKG Q-T INTERVAL: 366 MS
EKG QRS DURATION: 98 MS
EKG QTC CALCULATION (BAZETT): 450 MS
EKG R AXIS: -65 DEGREES
EKG T AXIS: 54 DEGREES
EKG VENTRICULAR RATE: 91 BPM

## 2025-07-29 PROCEDURE — 93010 ELECTROCARDIOGRAM REPORT: CPT | Performed by: INTERNAL MEDICINE

## 2025-07-29 NOTE — DISCHARGE INSTRUCTIONS
Use Tylenol or Motrin for aches or pains.  Monitor for vomiting worsening symptoms or progression.  Take Zithromax daily.  Next dose tomorrow.  Take prednisone daily.  Next dose tomorrow.  Follow-up with primary care

## 2025-07-29 NOTE — ED PROVIDER NOTES
[facility]  [address]  [facility] [address]  Phone: [phone]  EMERGENCY DEPARTMENT ENCOUNTER      Pt Name: Shiva De Los Santos  MRN: [id number]  Birthdate 1965  Date of evaluation: 7/28/2025  Provider: Harry Perry MD    CHIEF COMPLAINT       Chief Complaint   Patient presents with    Shortness of Breath         HISTORY OF PRESENT ILLNESS      Shiva De Los Santos is a 59 y.o. male who presents to the emergency department with above noted complaint.  Patient has had some cough and congestion and was not sure if he had pneumonia or inflammation from allergies.  He has drainage and cough and chest pain.  Describes chest pain with coughing        REVIEW OF SYSTEMS     Cough congestion chest pain.  No nausea vomiting diaphoresis.  No leg swelling  Review of Systems  All systems negative except as marked.     PAST MEDICAL HISTORY     Past Medical History:   Diagnosis Date    Arthritis     knees, hands    Liver disease     as a kid--type unknown    Male circumcision 11/22/2016    Pneumonia     Pre-diabetes     PTSD (post-traumatic stress disorder)     anxiety from covid         SURGICAL HISTORY       Past Surgical History:   Procedure Laterality Date    CIRCUMCISION  2008    CIRCUMCISION  11/22/2016    COLONOSCOPY  2018    [facility]    JOINT REPLACEMENT  09/2020    [facility]    KNEE SURGERY      bilateral torn meniscus         CURRENT MEDICATIONS       Discharge Medication List as of 7/28/2025 10:27 PM        CONTINUE these medications which have NOT CHANGED    Details   ALPRAZolam (XANAX PO) Take by mouth as neededHistorical Med      tamsulosin (FLOMAX) 0.4 MG capsule Take 1 capsule by mouth daily, Disp-90 capsule, R-3Normal      clotrimazole-betamethasone (LOTRISONE) 1-0.05 % cream Apply topically 2 times daily as needed for balanitis, Disp-1 each, R-3, Normal      !! celecoxib (CELEBREX) 100 MG capsule Take 1 capsule by mouth 2 times daily, Disp-60 capsule, R-0Normal      !!

## 2025-07-29 NOTE — ED NOTES
Discharge teaching and instructions for condition explained to patient. AVS reviewed. Went over prescriptions with patient. Patient voiced understanding regarding prescriptions, follow up appointments and care of self at home. Pt discharged to home in stable condition per wife's care.

## 2025-08-02 ENCOUNTER — HOSPITAL ENCOUNTER (EMERGENCY)
Age: 60
Discharge: HOME | End: 2025-08-02
Attending: FAMILY MEDICINE
Payer: COMMERCIAL

## 2025-08-02 VITALS
BODY MASS INDEX: 42.38 KG/M2 | DIASTOLIC BLOOD PRESSURE: 64 MMHG | OXYGEN SATURATION: 98 % | TEMPERATURE: 97.7 F | WEIGHT: 270 LBS | RESPIRATION RATE: 16 BRPM | HEART RATE: 85 BPM | SYSTOLIC BLOOD PRESSURE: 152 MMHG | HEIGHT: 67 IN

## 2025-08-02 DIAGNOSIS — R73.9 ELEVATED BLOOD SUGAR: ICD-10-CM

## 2025-08-02 DIAGNOSIS — G44.209 TENSION HEADACHE: Primary | ICD-10-CM

## 2025-08-02 LAB — GLUCOSE BLD STRIP.AUTO-MCNC: 272 MG/DL (ref 70–108)

## 2025-08-02 PROCEDURE — 82948 REAGENT STRIP/BLOOD GLUCOSE: CPT

## 2025-08-02 PROCEDURE — 99282 EMERGENCY DEPT VISIT SF MDM: CPT

## 2025-08-02 RX ORDER — ACETAMINOPHEN 325 MG/1
650 TABLET ORAL EVERY 6 HOURS PRN
COMMUNITY

## 2025-08-02 RX ORDER — IBUPROFEN 200 MG
200 TABLET ORAL EVERY 6 HOURS PRN
COMMUNITY

## 2025-08-02 ASSESSMENT — PAIN DESCRIPTION - LOCATION
LOCATION: HEAD
LOCATION: HEAD

## 2025-08-02 ASSESSMENT — PAIN - FUNCTIONAL ASSESSMENT
PAIN_FUNCTIONAL_ASSESSMENT: 0-10
PAIN_FUNCTIONAL_ASSESSMENT: 0-10

## 2025-08-02 ASSESSMENT — ENCOUNTER SYMPTOMS
VOMITING: 0
COUGH: 0
ABDOMINAL PAIN: 0
NAUSEA: 0
SHORTNESS OF BREATH: 0

## 2025-08-02 ASSESSMENT — PAIN SCALES - GENERAL
PAINLEVEL_OUTOF10: 10
PAINLEVEL_OUTOF10: 8

## 2025-08-02 ASSESSMENT — LIFESTYLE VARIABLES
HOW OFTEN DO YOU HAVE A DRINK CONTAINING ALCOHOL: NEVER
HOW MANY STANDARD DRINKS CONTAINING ALCOHOL DO YOU HAVE ON A TYPICAL DAY: PATIENT DOES NOT DRINK

## 2025-08-02 ASSESSMENT — PAIN DESCRIPTION - DESCRIPTORS: DESCRIPTORS: DISCOMFORT
